# Patient Record
Sex: FEMALE | Race: OTHER | Employment: PART TIME | URBAN - METROPOLITAN AREA
[De-identification: names, ages, dates, MRNs, and addresses within clinical notes are randomized per-mention and may not be internally consistent; named-entity substitution may affect disease eponyms.]

---

## 2021-11-20 ENCOUNTER — HOSPITAL ENCOUNTER (EMERGENCY)
Facility: HOSPITAL | Age: 21
Discharge: HOME | End: 2021-11-20
Attending: EMERGENCY MEDICINE
Payer: MEDICAID

## 2021-11-20 VITALS
DIASTOLIC BLOOD PRESSURE: 80 MMHG | WEIGHT: 135 LBS | TEMPERATURE: 98 F | RESPIRATION RATE: 18 BRPM | OXYGEN SATURATION: 98 % | BODY MASS INDEX: 23.05 KG/M2 | HEART RATE: 72 BPM | SYSTOLIC BLOOD PRESSURE: 120 MMHG | HEIGHT: 64 IN

## 2021-11-20 DIAGNOSIS — L30.9 DERMATITIS: Primary | ICD-10-CM

## 2021-11-20 PROCEDURE — 99283 EMERGENCY DEPT VISIT LOW MDM: CPT

## 2021-11-20 NOTE — ED PROVIDER NOTES
Patient Seen in: [facility] Emergency Department      History   Patient presents with:  Rash Skin Problem    Stated Complaint: rash     Subjective:   HPI    20-year-old female presents reporting rash to bilateral.  She reports several months of dry to the face, lips, tongue. Normal voice  Extremities: no edema, normal peripheral pulses   Neuro: Alert oriented and nonfocal   Skin: Dry cracking skin to bilateral hands.   Photograph she shows me is of a maculopapular erythematous rash to the dorsal aspe